# Patient Record
Sex: FEMALE | Race: WHITE | URBAN - METROPOLITAN AREA
[De-identification: names, ages, dates, MRNs, and addresses within clinical notes are randomized per-mention and may not be internally consistent; named-entity substitution may affect disease eponyms.]

---

## 2017-01-25 NOTE — PATIENT DISCUSSION
Hypertensive Retinopathy Counseling: The pathophysiology of hypertensive retinopathy and associated comorbidity was discussed with the patient. The importance of compliance with medications, and good blood pressure control was emphasized to limit risk of retinal ischemia and loss of vision. The patient should return for follow up immediately if any change of vision.

## 2017-01-25 NOTE — PATIENT DISCUSSION
Dry Eye Syndrome Counseling: I have discussed the diagnosis and the pathophysiology of this disease with the patient. Eyelid pathology and systemic illnesses such as Sjogren&rsquo;s disease or rheumatoid arthritis may contribute to severity. Vision may be limited by dry eye, and symptoms exacerbated by environmental factors such as smoke, wind, or prolonged eye use. Lifestyle habits and environmental factors contributing to dry eyes have been reviewed with the patient. Daily prescribed and over the counter medications, along with their potential contributions to dry eye symptoms, have been discussed. Treatment options include, but are not limited to, artificial tears, punctal plugs, topical cyclosporine, oral omega-3 supplements, Lipiflow, moisture goggles, and lubricating ointments. I stressed the importance of compliance with treatment.

## 2017-01-25 NOTE — PATIENT DISCUSSION
AMD (DRY), OU: PRESCRIBE AREDS 2 VITAMINS / AMSLER GRID QD/ UV PROTECTION. SMOKING CESSATION EMPHASIZED. RETURN FOR FOLLOW-UP AS SCHEDULED.

## 2017-01-25 NOTE — PATIENT DISCUSSION
AMD (Dry) Counseling: I have instructed the patient to take an AREDS 2 vitamin mixture to minimize the risk of disease progression. The importance of daily monitoring with Amsler grid was emphasized and an Amsler grid was provided if the patient did not have one. The patient was advised to call the office if new symptoms of persistent blurring or distortion of vision arise as evaluation and possible treatment is necessary to preserve as much vision as possible. Return for follow-up as scheduled.

## 2017-01-25 NOTE — PATIENT DISCUSSION
Cataract: Not visually significant to proceed with surgery at this time. I have discussed continuing with current spectacles vs updating. I have offered the patient a new spectacle prescription to fill if desires. Return to follow up as schedled or sooner if symptoms arise.

## 2017-01-25 NOTE — PATIENT DISCUSSION
HYPERTENSIVE RETINOPATHY OU:  IMPORTANCE OF GOOD BLOOD PRESSURE CONTROL STRESSED. KEEP FU WITH PCP AND AS SCHEDULED.

## 2018-03-02 NOTE — PATIENT DISCUSSION
MODERATE DRY EYES: PRESCRIBED DISAPPEARING PRESERVATIVE OR PRESERVATIVE FREE ARTIFICIAL TEARS BID ? QID4-6X A DAY, OU AND THE DAILY INTAKE OF OMEGA-3 DHA/EPA FATTY ACIDS TO HELP RELIEVE SYMPTOMS. ADD NIGHTLY LUBRICATING OINTMENT OR GEL. ADD  RESTASISBID OU AND SAMPLE OF LOTEMAX MARGAUX GIVEN IN OFFICE TODAY. CONSIDER LL PUNCTAL PLUGS NEXT VISIT. RETURN FOR FOLLOW-UP AS SCHEDULED OR SOONER IF SYMPTOMS WORSEN.

## 2018-06-05 NOTE — PATIENT DISCUSSION
Continue: Restasis MultiDose (cyclosporine): drops: 0.05% 1 drop twice a day as directed into both eyes 03-

## 2018-06-05 NOTE — PATIENT DISCUSSION
MODERATE DRY EYES: IMPROVED VA AND COMFORT ON RESTASIS; CONTINUE RESTASIS BID OU, DISAPPEARING PRESERVATIVE OR PRESERVATIVE FREE ARTIFICIAL TEARS PRN OU AND THE DAILY INTAKE OF OMEGA-3 DHA/EPA FATTY ACIDS. ADD NIGHTLY LUBRICATING OINTMENT OR GEL AND CONSIDER LL PUNCTAL PLUGS IF SYMPTOMS WORSEN.

## 2018-07-10 ENCOUNTER — IMPORTED ENCOUNTER (OUTPATIENT)
Dept: URBAN - METROPOLITAN AREA CLINIC 9 | Facility: CLINIC | Age: 78
End: 2018-07-10

## 2018-10-24 NOTE — PATIENT DISCUSSION
BRANCH RETINAL ARTERY OCCLUSION, OS:  NEW ONSET. LIKELY RELATED TO AFIB. RESOLVING. PT CURRENTLY ON BLOOD THINNER. ENCOURAGE GOOD B/P AND CHOLESTEROL CONTROL. KEEP FU PCP/CARDIOLOGIST.  CALLBACK INSTRUCTIONS GIVEN

## 2018-11-28 NOTE — PATIENT DISCUSSION
Branch Retinal Artery Occlusion Counseling: I have discussed the diagnosis and its pathophysiology with the patient. I have further explained although there are not treatments that have been shown to reverse the damage done by a retinal artery occlusion, follow-up is necessary to evaluate for serious complications that can occur including the formation of new abnormal blood vessels and elevated eye pressure. Return for follow-up as scheduled.

## 2019-03-19 ENCOUNTER — IMPORTED ENCOUNTER (OUTPATIENT)
Dept: URBAN - METROPOLITAN AREA CLINIC 9 | Facility: CLINIC | Age: 79
End: 2019-03-19

## 2019-06-26 NOTE — PATIENT DISCUSSION
KSICCA : CONTINUE RESTASIS BID OU AND DISAPPEARING PRESERVATIVE OR PRESERVATIVE FREE ARTIFICIAL TEARS 4-6X A DAY, OU AND THE DAILY INTAKE OF OMEGA-3 DHA/EPA FATTY ACIDS TO HELP RELIEVE SYMPTOMS. ADD NIGHTLY LUBRICATING OINTMENT OR GEL.

## 2019-06-26 NOTE — PATIENT DISCUSSION
Stopped Today: Restasis MultiDose (cyclosporine): drops: 0.05% 1 drop twice a day as directed into both eyes 03-

## 2019-08-07 ENCOUNTER — IMPORTED ENCOUNTER (OUTPATIENT)
Dept: URBAN - METROPOLITAN AREA CLINIC 9 | Facility: CLINIC | Age: 79
End: 2019-08-07

## 2019-08-30 ENCOUNTER — IMPORTED ENCOUNTER (OUTPATIENT)
Dept: URBAN - METROPOLITAN AREA CLINIC 9 | Facility: CLINIC | Age: 79
End: 2019-08-30

## 2019-10-04 ENCOUNTER — IMPORTED ENCOUNTER (OUTPATIENT)
Dept: URBAN - METROPOLITAN AREA CLINIC 9 | Facility: CLINIC | Age: 79
End: 2019-10-04

## 2019-11-20 ENCOUNTER — IMPORTED ENCOUNTER (OUTPATIENT)
Dept: URBAN - METROPOLITAN AREA CLINIC 9 | Facility: CLINIC | Age: 79
End: 2019-11-20

## 2020-07-24 NOTE — PATIENT DISCUSSION
KSICCA : CONTINUE RESTASIS BID OU AND DISAPPEARING PRESERVATIVE OR PRESERVATIVE FREE ARTIFICIAL TEARS 4-6X A DAY, OU AND THE DAILY INTAKE OF OMEGA-3 DHA/EPA FATTY ACIDS TO HELP RELIEVE SYMPTOMS. ADD NIGHTLY LUBRICATING OINTMENT OR GEL.  CONTINUE RESTASIS BID OU

## 2020-07-24 NOTE — PATIENT DISCUSSION
MILD EARLY AMD (DRY), OU: PRESCRIBE AREDS 2 VITAMINS / AMSLER GRID QD/ UV PROTECTION. SMOKING CESSATION EMPHASIZED. RETURN FOR FOLLOW-UP AS SCHEDULED.

## 2020-07-24 NOTE — PATIENT DISCUSSION
BRANCH RETINAL ARTERY OCCLUSION, OS: RESOLVED, NO EDEMA. ENCOURAGE GOOD B/P AND CHOLESTEROL CONTROL. KEEP FU PCP/CARDIOLOGIST.  CALLBACK INSTRUCTIONS GIVEN

## 2020-09-23 ENCOUNTER — IMPORTED ENCOUNTER (OUTPATIENT)
Dept: URBAN - METROPOLITAN AREA CLINIC 9 | Facility: CLINIC | Age: 80
End: 2020-09-23

## 2021-09-27 ENCOUNTER — IMPORTED ENCOUNTER (OUTPATIENT)
Dept: URBAN - METROPOLITAN AREA CLINIC 9 | Facility: CLINIC | Age: 81
End: 2021-09-27

## 2021-10-16 ASSESSMENT — VISUAL ACUITY
OD_SC: 20/25 SN
OD_SC: 20/70 +2 SN
OD_CC: 20/20 - SN
OS_SC: 20/20 SN
OS_SC: 20/25 - SN
OD_CC: 20/25 - SN
OD_SC: 20/25 SN
OD_SC: 20/25 SN
OS_SC: 20/20 SN
OD_CC: 20/20 -2 SN
OD_SC: 20/30 + SN
OS_CC: 20/25 - SN
OS_SC: 20/30 SN
OS_SC: 20/50 SN
OS_CC: 20/20 SN
OD_SC: 20/70 SN
OS_SC: 20/20 SN
OS_SC: 20/25 -2 SN
OS_CC: 20/20 SN

## 2021-10-16 ASSESSMENT — KERATOMETRY
OD_K2POWER_DIOPTERS: 43.5
OS_AXISANGLE_DEGREES: 125
OD_AXISANGLE2_DEGREES: 3
OS_AXISANGLE_DEGREES: 112
OS_AXISANGLE_DEGREES: 45
OD_K1POWER_DIOPTERS: 42.5
OD_AXISANGLE_DEGREES: 32
OD_K1POWER_DIOPTERS: 42.25
OD_K1POWER_DIOPTERS: 42.75
OS_AXISANGLE2_DEGREES: 22
OS_AXISANGLE2_DEGREES: 35
OS_K1POWER_DIOPTERS: 42.5
OD_AXISANGLE_DEGREES: 90
OS_AXISANGLE2_DEGREES: 135
OD_K2POWER_DIOPTERS: 43
OS_K1POWER_DIOPTERS: 42.25
OS_K2POWER_DIOPTERS: 43
OD_AXISANGLE2_DEGREES: 122
OD_K2POWER_DIOPTERS: 43.25
OS_K1POWER_DIOPTERS: 42
OD_AXISANGLE_DEGREES: 93
OS_K2POWER_DIOPTERS: 42.75
OD_AXISANGLE2_DEGREES: 180
OS_K2POWER_DIOPTERS: 42.5

## 2021-10-16 ASSESSMENT — TONOMETRY
OD_IOP_MMHG: 13
OD_IOP_MMHG: 15
OD_IOP_MMHG: 14
OD_IOP_MMHG: 15
OS_IOP_MMHG: 15
OS_IOP_MMHG: 13
OS_IOP_MMHG: 12
OS_IOP_MMHG: 13

## 2022-12-01 ENCOUNTER — ESTABLISHED PATIENT (OUTPATIENT)
Dept: URBAN - METROPOLITAN AREA CLINIC 9 | Facility: CLINIC | Age: 82
End: 2022-12-01

## 2022-12-01 PROCEDURE — 92014 COMPRE OPH EXAM EST PT 1/>: CPT

## 2022-12-01 PROCEDURE — 92015 DETERMINE REFRACTIVE STATE: CPT

## 2022-12-01 PROCEDURE — 92134 CPTRZ OPH DX IMG PST SGM RTA: CPT

## 2022-12-01 ASSESSMENT — VISUAL ACUITY
OD_SC: 20/40-2
OU_SC: 20/20-2
OU_SC: J1
OS_SC: J3
OS_SC: 20/20-2
OD_SC: J3

## 2022-12-01 ASSESSMENT — TONOMETRY
OS_IOP_MMHG: 12
OD_IOP_MMHG: 20

## 2022-12-01 ASSESSMENT — KERATOMETRY
OS_K1POWER_DIOPTERS: 42.00
OD_AXISANGLE_DEGREES: 94
OD_K1POWER_DIOPTERS: 43.00
OD_AXISANGLE2_DEGREES: 4
OS_K2POWER_DIOPTERS: 42.50
OS_AXISANGLE_DEGREES: 109
OD_K2POWER_DIOPTERS: 43.25
OS_AXISANGLE2_DEGREES: 19

## 2024-01-09 ENCOUNTER — ESTABLISHED PATIENT (OUTPATIENT)
Dept: URBAN - METROPOLITAN AREA CLINIC 17 | Facility: CLINIC | Age: 84
End: 2024-01-09

## 2024-01-09 DIAGNOSIS — H02.421: ICD-10-CM

## 2024-01-09 DIAGNOSIS — H16.223: ICD-10-CM

## 2024-01-09 PROCEDURE — 92014 COMPRE OPH EXAM EST PT 1/>: CPT

## 2024-01-09 ASSESSMENT — VISUAL ACUITY
OD_SC: 20/25
OU_SC: J2
OS_SC: 20/25

## 2024-01-09 ASSESSMENT — TONOMETRY
OS_IOP_MMHG: 17
OD_IOP_MMHG: 16

## 2024-01-31 NOTE — PATIENT DISCUSSION
New Prescription: Restasis MultiDose (cyclosporine): drops: 0.05% 1 drop twice a day as directed into both eyes 03- Name band;

## 2024-12-17 ENCOUNTER — CONSULTATION/EVALUATION (OUTPATIENT)
Age: 84
End: 2024-12-17

## 2024-12-17 DIAGNOSIS — H02.421: ICD-10-CM

## 2024-12-17 PROCEDURE — 99214 OFFICE O/P EST MOD 30 MIN: CPT

## 2024-12-17 PROCEDURE — 92285 EXTERNAL OCULAR PHOTOGRAPHY: CPT

## 2025-01-09 ENCOUNTER — COMPREHENSIVE EXAM (OUTPATIENT)
Age: 85
End: 2025-01-09

## 2025-01-09 DIAGNOSIS — H35.363: ICD-10-CM

## 2025-01-09 DIAGNOSIS — H35.373: ICD-10-CM

## 2025-01-09 DIAGNOSIS — H16.223: ICD-10-CM

## 2025-01-09 DIAGNOSIS — H52.223: ICD-10-CM

## 2025-01-09 PROCEDURE — 92014 COMPRE OPH EXAM EST PT 1/>: CPT

## 2025-01-09 PROCEDURE — 92134 CPTRZ OPH DX IMG PST SGM RTA: CPT

## 2025-01-09 PROCEDURE — 92015 DETERMINE REFRACTIVE STATE: CPT

## 2025-03-04 ENCOUNTER — COMPREHENSIVE EXAM (OUTPATIENT)
Age: 85
End: 2025-03-04

## 2025-03-04 ENCOUNTER — DUPLICATE ENCOUNTER (OUTPATIENT)
Age: 85
End: 2025-03-04

## 2025-03-04 DIAGNOSIS — H35.40: ICD-10-CM

## 2025-03-04 DIAGNOSIS — H35.372: ICD-10-CM

## 2025-03-04 DIAGNOSIS — H43.822: ICD-10-CM

## 2025-03-04 DIAGNOSIS — H43.811: ICD-10-CM

## 2025-03-04 PROCEDURE — 92134 CPTRZ OPH DX IMG PST SGM RTA: CPT

## 2025-03-04 PROCEDURE — 92014 COMPRE OPH EXAM EST PT 1/>: CPT

## 2025-03-04 PROCEDURE — 92201 OPSCPY EXTND RTA DRAW UNI/BI: CPT

## 2025-03-19 ENCOUNTER — EMERGENCY VISIT (OUTPATIENT)
Age: 85
End: 2025-03-19

## 2025-03-19 DIAGNOSIS — H43.811: ICD-10-CM

## 2025-03-19 DIAGNOSIS — H43.822: ICD-10-CM

## 2025-03-19 DIAGNOSIS — H35.372: ICD-10-CM

## 2025-03-19 DIAGNOSIS — H35.40: ICD-10-CM

## 2025-03-19 PROCEDURE — 99214 OFFICE O/P EST MOD 30 MIN: CPT

## 2025-03-19 PROCEDURE — 92134 CPTRZ OPH DX IMG PST SGM RTA: CPT

## 2025-05-07 ENCOUNTER — FOLLOW UP (OUTPATIENT)
Age: 85
End: 2025-05-07

## 2025-05-07 DIAGNOSIS — H43.822: ICD-10-CM

## 2025-05-07 DIAGNOSIS — H43.811: ICD-10-CM

## 2025-05-07 DIAGNOSIS — H35.40: ICD-10-CM

## 2025-05-07 DIAGNOSIS — H35.372: ICD-10-CM

## 2025-05-07 PROCEDURE — 99213 OFFICE O/P EST LOW 20 MIN: CPT

## 2025-05-07 PROCEDURE — 92134 CPTRZ OPH DX IMG PST SGM RTA: CPT

## 2025-06-02 ENCOUNTER — SURGERY/PROCEDURE (OUTPATIENT)
Age: 85
End: 2025-06-02

## 2025-06-02 DIAGNOSIS — H02.421: ICD-10-CM

## 2025-06-02 PROCEDURE — 67904 REPAIR EYELID DEFECT: CPT

## 2025-06-11 ENCOUNTER — POST-OP (OUTPATIENT)
Age: 85
End: 2025-06-11

## 2025-06-11 DIAGNOSIS — Z98.890: ICD-10-CM

## 2025-06-11 DIAGNOSIS — H02.421: ICD-10-CM

## 2025-06-11 PROCEDURE — 99024 POSTOP FOLLOW-UP VISIT: CPT

## 2025-08-21 ENCOUNTER — CONTACT LENSES/GLASSES VISIT (OUTPATIENT)
Age: 85
End: 2025-08-21

## 2025-08-21 DIAGNOSIS — H52.223: ICD-10-CM

## 2025-08-21 PROCEDURE — 92015 DETERMINE REFRACTIVE STATE: CPT
